# Patient Record
Sex: FEMALE | Race: OTHER | URBAN - METROPOLITAN AREA
[De-identification: names, ages, dates, MRNs, and addresses within clinical notes are randomized per-mention and may not be internally consistent; named-entity substitution may affect disease eponyms.]

---

## 2021-08-12 ENCOUNTER — TELEPHONE (OUTPATIENT)
Dept: PRIMARY CARE CLINIC | Age: 26
End: 2021-08-12

## 2021-08-12 NOTE — TELEPHONE ENCOUNTER
----- Message from Wilson Flores sent at 8/12/2021 12:45 PM EDT -----  Subject: Message to Provider    QUESTIONS  Information for Provider? PT has 2 small children that needs shots (12   months) and the other needs a physical (3 yo). Please call to schedule   appointment.   ---------------------------------------------------------------------------  --------------  [place] [facility] INFO  What is the best way for the office to contact you? OK to leave message on   voicemail  Preferred Call Back Phone Number? [phone]  ---------------------------------------------------------------------------  --------------  SCRIPT ANSWERS  Relationship to Patient?  Self

## 2021-08-12 NOTE — TELEPHONE ENCOUNTER
Called and left message for mother to call office back with the kind of insurance they have. We cannot give vaccination to [facility] or medicare patient in office.

## 2021-09-17 ENCOUNTER — OFFICE VISIT (OUTPATIENT)
Dept: FAMILY MEDICINE CLINIC | Age: 26
End: 2021-09-17
Payer: MEDICARE

## 2021-09-17 VITALS
WEIGHT: 124 LBS | DIASTOLIC BLOOD PRESSURE: 64 MMHG | OXYGEN SATURATION: 98 % | HEIGHT: 62 IN | SYSTOLIC BLOOD PRESSURE: 106 MMHG | BODY MASS INDEX: 22.82 KG/M2 | HEART RATE: 76 BPM

## 2021-09-17 DIAGNOSIS — Z00.01 ENCOUNTER FOR WELL ADULT EXAM WITH ABNORMAL FINDINGS: Primary | ICD-10-CM

## 2021-09-17 DIAGNOSIS — K21.9 GASTROESOPHAGEAL REFLUX DISEASE WITHOUT ESOPHAGITIS: ICD-10-CM

## 2021-09-17 DIAGNOSIS — E03.9 ACQUIRED HYPOTHYROIDISM: ICD-10-CM

## 2021-09-17 PROBLEM — N39.3 STRESS BLADDER INCONTINENCE, FEMALE: Status: ACTIVE | Noted: 2020-10-13

## 2021-09-17 PROBLEM — Z98.890 S/P LUMPECTOMY, LEFT BREAST: Status: ACTIVE | Noted: 2017-10-30

## 2021-09-17 PROBLEM — R12 HEARTBURN DURING PREGNANCY IN FIRST TRIMESTER: Status: RESOLVED | Noted: 2020-02-27 | Resolved: 2021-09-17

## 2021-09-17 PROBLEM — O26.891 HEARTBURN DURING PREGNANCY IN FIRST TRIMESTER: Status: RESOLVED | Noted: 2020-02-27 | Resolved: 2021-09-17

## 2021-09-17 PROBLEM — N64.89 OCCLUSION OF BREAST DUCT: Status: ACTIVE | Noted: 2020-09-01

## 2021-09-17 PROBLEM — O26.891 HEARTBURN DURING PREGNANCY IN FIRST TRIMESTER: Status: ACTIVE | Noted: 2020-02-27

## 2021-09-17 PROBLEM — R12 HEARTBURN DURING PREGNANCY IN FIRST TRIMESTER: Status: ACTIVE | Noted: 2020-02-27

## 2021-09-17 PROBLEM — N64.89 OCCLUSION OF BREAST DUCT: Status: RESOLVED | Noted: 2020-09-01 | Resolved: 2021-09-17

## 2021-09-17 PROCEDURE — 99213 OFFICE O/P EST LOW 20 MIN: CPT | Performed by: FAMILY MEDICINE

## 2021-09-17 PROCEDURE — 99385 PREV VISIT NEW AGE 18-39: CPT | Performed by: FAMILY MEDICINE

## 2021-09-17 RX ORDER — LEVOTHYROXINE SODIUM 0.05 MG/1
50 TABLET ORAL
COMMUNITY
Start: 2020-10-15 | End: 2021-09-17 | Stop reason: SDUPTHER

## 2021-09-17 RX ORDER — LEVOTHYROXINE SODIUM 0.05 MG/1
50 TABLET ORAL DAILY
Qty: 90 TABLET | Refills: 1 | Status: CANCELLED | OUTPATIENT
Start: 2021-09-17

## 2021-09-17 RX ORDER — LEVOTHYROXINE SODIUM 0.05 MG/1
50 TABLET ORAL DAILY
Qty: 90 TABLET | Refills: 3 | Status: SHIPPED | OUTPATIENT
Start: 2021-09-17 | End: 2022-08-16

## 2021-09-17 RX ORDER — OMEPRAZOLE 20 MG/1
20 CAPSULE, DELAYED RELEASE ORAL
Qty: 90 CAPSULE | Refills: 1 | Status: SHIPPED | OUTPATIENT
Start: 2021-09-17 | End: 2022-08-15

## 2021-09-17 SDOH — ECONOMIC STABILITY: FOOD INSECURITY: WITHIN THE PAST 12 MONTHS, THE FOOD YOU BOUGHT JUST DIDN'T LAST AND YOU DIDN'T HAVE MONEY TO GET MORE.: NEVER TRUE

## 2021-09-17 SDOH — ECONOMIC STABILITY: FOOD INSECURITY: WITHIN THE PAST 12 MONTHS, YOU WORRIED THAT YOUR FOOD WOULD RUN OUT BEFORE YOU GOT MONEY TO BUY MORE.: NEVER TRUE

## 2021-09-17 ASSESSMENT — PATIENT HEALTH QUESTIONNAIRE - PHQ9
1. LITTLE INTEREST OR PLEASURE IN DOING THINGS: 0
2. FEELING DOWN, DEPRESSED OR HOPELESS: 0
SUM OF ALL RESPONSES TO PHQ QUESTIONS 1-9: 0
SUM OF ALL RESPONSES TO PHQ9 QUESTIONS 1 & 2: 0
SUM OF ALL RESPONSES TO PHQ QUESTIONS 1-9: 0
SUM OF ALL RESPONSES TO PHQ QUESTIONS 1-9: 0

## 2021-09-17 ASSESSMENT — ENCOUNTER SYMPTOMS
ABDOMINAL PAIN: 0
VOMITING: 0
DIARRHEA: 0
NAUSEA: 0
SHORTNESS OF BREATH: 0
WHEEZING: 0
COUGH: 0
SINUS PRESSURE: 0

## 2021-09-17 ASSESSMENT — SOCIAL DETERMINANTS OF HEALTH (SDOH): HOW HARD IS IT FOR YOU TO PAY FOR THE VERY BASICS LIKE FOOD, HOUSING, MEDICAL CARE, AND HEATING?: NOT HARD AT ALL

## 2021-09-17 NOTE — ASSESSMENT & PLAN NOTE
Established, uncontrolled. Symptoms improved with PPI every 2 to 3 days but she is unable to wean off completely. She reports regurgitation of food. No red flag findings concerning for malignancy. Will defer testing for H. pylori to GI. Referral placed to GI. Continue omeprazole 20 mg daily.   Patient agrees with this plan

## 2021-09-17 NOTE — PROGRESS NOTES
Single     Spouse name: Not on file    Number of children: Not on file    Years of education: Not on file    Highest education level: Not on file   Occupational History    Not on file   Tobacco Use    Smoking status: Never Smoker    Smokeless tobacco: Never Used   Vaping Use    Vaping Use: Never used   Substance and Sexual Activity    Alcohol use: Not Currently     Comment: socially only     Drug use: Never    Sexual activity: Yes     Partners: Male   Other Topics Concern    Not on file   Social History Narrative    Not on file     Social Determinants of Health     Financial Resource Strain: Low Risk     Difficulty of Paying Living Expenses: Not hard at all   Food Insecurity: No Food Insecurity    Worried About Running Out of Food in the Last Year: Never true    [address] in the Last Year: Never true   Transportation Needs:     Lack of Transportation (Medical):      Lack of Transportation (Non-Medical):    Physical Activity:     Days of Exercise per Week:     Minutes of Exercise per Session:    Stress:     Feeling of Stress :    Social Connections:     Frequency of Communication with Friends and Family:     Frequency of Social Gatherings with Friends and Family:     Attends Oriental orthodox Services:     Active Member of Clubs or Organizations:     Attends Club or Organization Meetings:     Marital Status:    Intimate Partner Violence:     Fear of Current or Ex-Partner:     Emotionally Abused:     Physically Abused:     Sexually Abused:         Family History   Problem Relation Age of Onset    Cancer Neg Hx     Diabetes Neg Hx     Heart Attack Neg Hx     Heart Disease Neg Hx     High Blood Pressure Neg Hx     High Cholesterol Neg Hx     Stroke Neg Hx        ADVANCE DIRECTIVE: N, <no information>    Vitals:    09/17/21 1040   BP: 106/64   Site: Right Upper Arm   Position: Sitting   Cuff Size: Medium Adult   Pulse: 76   SpO2: 98%   Weight: 124 lb (56.2 kg)   Height: 5' 2\" (1.575 m) Estimated body mass index is 22.68 kg/m² as calculated from the following:    Height as of this encounter: 5' 2\" (1.575 m). Weight as of this encounter: 124 lb (56.2 kg). Physical Exam  Constitutional:       Appearance: Normal appearance. HENT:      Head: Normocephalic. Nose: Nose normal.   Eyes:      Conjunctiva/sclera: Conjunctivae normal.   Cardiovascular:      Rate and Rhythm: Normal rate and regular rhythm. Pulses: Normal pulses. Heart sounds: Normal heart sounds. No murmur heard. No gallop. Pulmonary:      Effort: Pulmonary effort is normal.      Breath sounds: Normal breath sounds. No wheezing or rhonchi. Abdominal:      General: Abdomen is flat. There is no distension. Neurological:      Mental Status: She is alert and oriented to person, place, and time. Psychiatric:         Behavior: Behavior normal.       Immunization History   Administered Date(s) Administered    Influenza Virus Vaccine 10/28/2016    Tdap (Boostrix, Adacel) 10/28/2016, 07/01/2020       Health Maintenance   Topic Date Due    TSH testing  Never done    Hepatitis C screen  Never done    Varicella vaccine (1 of 2 - 2-dose childhood series) Never done    HPV vaccine (1 - 2-dose series) Never done    COVID-19 Vaccine (1) Never done    HIV screen  Never done    Flu vaccine (1) 09/01/2021    Pap smear  02/27/2023    DTaP/Tdap/Td vaccine (3 - Td or Tdap) 07/01/2030    Hepatitis A vaccine  Aged Out    Hepatitis B vaccine  Aged Out    Hib vaccine  Aged Out    Meningococcal (ACWY) vaccine  Aged Out    Pneumococcal 0-64 years Vaccine  Aged Out          ASSESSMENT/PLAN:  1. Encounter for well adult exam with abnormal findings  Assessment & Plan:  Well female. Up-to-date on Pap. Due in February 2023. Has 2 children ages 3 and 3. Uses condoms for contraception. Previously on birth control pills; declines them at this time  2. Acquired hypothyroidism  Assessment & Plan:  Established, stable.   Ran out of Synthroid yesterday. due for TSH check. refill placed for Synthroid 50 mcg daily. Will adjust dose as needed based on labs. Recommend she take the thyroid and omeprazole at different times during the day as the omeprazole decreases absorption of Synthroid. Previously took them at the same time and will change when she takes them  Orders:  -     TSH with Reflex; Future  -     Lipid Panel; Future  -     Comprehensive Metabolic Panel; Future  -     levothyroxine (SYNTHROID) 50 MCG tablet; Take 1 tablet by mouth Daily, Disp-90 tablet, R-3Normal  3. Gastroesophageal reflux disease without esophagitis  Assessment & Plan:  Established, uncontrolled. Symptoms improved with PPI every 2 to 3 days but she is unable to wean off completely. She reports regurgitation of food. No red flag findings concerning for malignancy. Will defer testing for H. pylori to GI. Referral placed to GI. Continue omeprazole 20 mg daily. Patient agrees with this plan  Orders:  -     omeprazole (PRILOSEC) 20 MG delayed release capsule; Take 1 capsule by mouth every morning (before breakfast), Disp-90 capsule, R-1Normal  -     Amb External Referral To Gastroenterology      Return in about 1 year (around 9/17/2022), or for lab review, for annual exam.       An electronic signature was used to authenticate this note.     --Danny Smiley MD on 9/17/2021 at 11:40 AM

## 2021-09-17 NOTE — ASSESSMENT & PLAN NOTE
Established, stable. Ran out of Synthroid yesterday. due for TSH check. refill placed for Synthroid 50 mcg daily. Will adjust dose as needed based on labs. Recommend she take the thyroid and omeprazole at different times during the day as the omeprazole decreases absorption of Synthroid.   Previously took them at the same time and will change when she takes them

## 2021-10-05 ENCOUNTER — OFFICE VISIT (OUTPATIENT)
Dept: FAMILY MEDICINE CLINIC | Age: 26
End: 2021-10-05
Payer: MEDICARE

## 2021-10-05 VITALS
HEART RATE: 69 BPM | TEMPERATURE: 97.9 F | BODY MASS INDEX: 21.79 KG/M2 | HEIGHT: 63 IN | OXYGEN SATURATION: 99 % | DIASTOLIC BLOOD PRESSURE: 64 MMHG | SYSTOLIC BLOOD PRESSURE: 102 MMHG | WEIGHT: 123 LBS

## 2021-10-05 DIAGNOSIS — G43.009 MIGRAINE WITHOUT AURA AND WITHOUT STATUS MIGRAINOSUS, NOT INTRACTABLE: ICD-10-CM

## 2021-10-05 DIAGNOSIS — N30.01 ACUTE CYSTITIS WITH HEMATURIA: Primary | ICD-10-CM

## 2021-10-05 LAB
BILIRUBIN URINE: NEGATIVE
BLOOD, URINE: ABNORMAL
CLARITY: CLEAR
COLOR: YELLOW
EPITHELIAL CELLS, UA: 4 /HPF (ref 0–5)
GLUCOSE URINE: NEGATIVE MG/DL
HYALINE CASTS: 1 /LPF (ref 0–8)
KETONES, URINE: NEGATIVE MG/DL
LEUKOCYTE ESTERASE, URINE: ABNORMAL
MICROSCOPIC EXAMINATION: YES
NITRITE, URINE: NEGATIVE
PH UA: 7.5 (ref 5–8)
PROTEIN UA: NEGATIVE MG/DL
RBC UA: 4 /HPF (ref 0–4)
SPECIFIC GRAVITY UA: 1.01 (ref 1–1.03)
URINE TYPE: ABNORMAL
UROBILINOGEN, URINE: 0.2 E.U./DL
WBC UA: 10 /HPF (ref 0–5)

## 2021-10-05 PROCEDURE — 99213 OFFICE O/P EST LOW 20 MIN: CPT | Performed by: FAMILY MEDICINE

## 2021-10-05 RX ORDER — NITROFURANTOIN 25; 75 MG/1; MG/1
100 CAPSULE ORAL 2 TIMES DAILY
Qty: 10 CAPSULE | Refills: 0 | Status: SHIPPED | OUTPATIENT
Start: 2021-10-05 | End: 2021-10-10

## 2021-10-05 RX ORDER — SUMATRIPTAN 25 MG/1
TABLET, FILM COATED ORAL
Qty: 9 TABLET | Refills: 1 | Status: SHIPPED
Start: 2021-10-05 | End: 2022-08-15

## 2021-10-05 NOTE — PROGRESS NOTES
Chief complaint: Urinary Tract Infection (burning sensation while urinaitng pain in lower abdomen and lower back  pt had uti last month seen at urgent care) and Migraine      SUBJECTIVE:  CLOVIS Call (:  1995) is a 31 y.o. female with a past medical history of GERD and hypothyroidism who presents with a chief complaint of: migraine x10 hours and urinary burning, frequency and urgency x1 day. She woke up this morning with a migraine which she has had before and threw up. She feels crummy from the migraine and has UTI sx as well. Denies fevers at home. She has pain in the front of the belly and low back pain. No flank pain. No upper back pain. Migraines: sometimes left sided, sometimes both sides; associated with light sensitivity and nausea/vomiting. Previously saw neurology when she lived in [place] but she was trying to get pregnant so she couldn't be on much. Tylenol doesn't touch the headache. She slept today and feels slightly better. No vision changes, weakness/numbness anywhere prior to headache. Review of Systems:  General: No F/C/NS/fatigue/wt loss   Cardiovascular: No CP  Respiratory: No SOB  GI: No N/V/D/C/abd pain/blood in stool  Neuro:+ headache; no weakness  Psych: No depressed mood/anxiety  Musculoskeletal: No myalgias    Current Outpatient Medications on File Prior to Visit   Medication Sig Dispense Refill    omeprazole (PRILOSEC) 20 MG delayed release capsule Take 1 capsule by mouth every morning (before breakfast) 90 capsule 1    levothyroxine (SYNTHROID) 50 MCG tablet Take 1 tablet by mouth Daily 90 tablet 3     No current facility-administered medications on file prior to visit.        OBJECTIVE:  /64   Pulse 69   Temp 97.9 °F (36.6 °C) (Oral)   Ht 5' 3\" (1.6 m)   Wt 123 lb (55.8 kg)   LMP 10/02/2021   SpO2 99%   BMI 21.79 kg/m²    Physical exam:  afebrile, vitals reviewed  Gen:  WD, WN, NAD, A&Ox3, pleasant  Eyes:  Sclerae clear  Neck:  Supple, No cervical

## 2021-10-05 NOTE — ASSESSMENT & PLAN NOTE
New, uncontrolled. Hx and exam consistent with UTI. Macrobid 100mg bid x5 days. Call clinic if sx don't improve. Pt agrees.

## 2021-10-05 NOTE — ASSESSMENT & PLAN NOTE
Chronic, uncontrolled. Improved with pregnancy but now occurring again as her kiddo is 1yr old. Trial imitrex 25mg po x1, repeat in 2hrs. Can increase the dose if ineffective. Counseled on s/e including drowsiness,  Jitteriness. If needing it more than 3x/week, call clinic to consider ppx therapy. Pt agrees with plan.

## 2021-10-06 LAB
ORGANISM: ABNORMAL
URINE CULTURE, ROUTINE: ABNORMAL

## 2021-10-17 PROBLEM — Z00.01 ENCOUNTER FOR WELL ADULT EXAM WITH ABNORMAL FINDINGS: Status: RESOLVED | Noted: 2021-09-17 | Resolved: 2021-10-17

## 2022-01-20 ENCOUNTER — IMMUNIZATION (OUTPATIENT)
Dept: PRIMARY CARE CLINIC | Age: 27
End: 2022-01-20
Payer: MEDICARE

## 2022-01-20 DIAGNOSIS — Z23 NEED FOR COVID-19 VACCINE: Primary | ICD-10-CM

## 2022-01-20 PROCEDURE — 0064A COVID-19, MODERNA BOOSTER VACCINE 0.25ML DOSE: CPT | Performed by: NURSE PRACTITIONER

## 2022-01-20 PROCEDURE — 91306 COVID-19, MODERNA BOOSTER VACCINE 0.25ML DOSE: CPT | Performed by: NURSE PRACTITIONER

## 2022-03-09 ENCOUNTER — OFFICE VISIT (OUTPATIENT)
Dept: FAMILY MEDICINE CLINIC | Age: 27
End: 2022-03-09
Payer: MEDICARE

## 2022-03-09 VITALS
WEIGHT: 125.8 LBS | HEIGHT: 61 IN | SYSTOLIC BLOOD PRESSURE: 111 MMHG | DIASTOLIC BLOOD PRESSURE: 75 MMHG | RESPIRATION RATE: 16 BRPM | TEMPERATURE: 97.3 F | HEART RATE: 84 BPM | BODY MASS INDEX: 23.75 KG/M2 | OXYGEN SATURATION: 100 %

## 2022-03-09 DIAGNOSIS — Z11.1 TUBERCULOSIS SCREENING: Primary | ICD-10-CM

## 2022-03-09 PROCEDURE — 86580 TB INTRADERMAL TEST: CPT | Performed by: FAMILY MEDICINE

## 2022-03-09 PROCEDURE — 99211 OFF/OP EST MAY X REQ PHY/QHP: CPT | Performed by: FAMILY MEDICINE

## 2022-03-09 RX ORDER — KETOTIFEN FUMARATE 0.35 MG/ML
SOLUTION/ DROPS OPHTHALMIC
COMMUNITY
Start: 2021-12-22 | End: 2022-08-15 | Stop reason: ALTCHOICE

## 2022-03-09 NOTE — PROGRESS NOTES
[facility] [address]  [address] [facility]  Phone: [phone]  Fax: [phone]    Sally Merino MD        March 9, 2022    Patient: Angela Dunn   YOB: 1995   Date of Visit: 3/9/2022       To Whom It May Concern:    Our clinic recently performed a tuberculosis skin test on one of your employees, Chrissy Lopez on 03/09/2022. Patient will return in 48 hours for our staff to read the results: 03/11/2022    LOT: Betzy Krishnamurthy  NDC: 57134-227-40  EXP: 06/02/2023  LOCATION: Left Forearm         If you have any questions or concerns, please don't hesitate to call.     Sincerely,        Sally Merino MD

## 2022-03-09 NOTE — LETTER
[facility] [address]  [address] [facility]  Phone: [phone]  Fax: [phone]    Argenis Sagastume MD        March 9, 2022    Patient: Severo Blend   YOB: 1995   Date of Visit: 3/9/2022       To Whom It May Concern:    Our clinic recently performed a tuberculosis skin test on one of your employees, Diane Mccartney on 03/09/2022. Patient will return in 48 hours for our staff to read the results: 03/11/2022    LOT: Ankush Cunningham  NDC: 94849-607-41  EXP: 06/02/2023  LOCATION: Left Forearm         If you have any questions or concerns, please don't hesitate to call.     Sincerely,        Argenis Sagastume MD

## 2022-03-09 NOTE — LETTER
[facility] [address]  [address] [facility]  Phone: [phone]  Fax: [phone]    Erik Clark MD        March 11, 2022    Patient: Milo Browning   YOB: 1995   Date of Visit: 3/9/2022       To Whom It May Concern:    Our clinic recently performed a tuberculosis skin test on one of your employees, [facility]. The results of this test are as follows:    Lab Results   Component Value Date    PPD negative 03/11/2022    INDURATION 0mm 03/11/2022     This test was negative for tuberculosis exposure per current Centers for Disease Control guidelines. A chest x-ray was not required. If you have any questions or concerns, please don't hesitate to call.     Sincerely,        Erik Clark MD

## 2022-03-11 LAB
INDURATION: NORMAL
TB SKIN TEST: NEGATIVE

## 2022-08-15 ENCOUNTER — OFFICE VISIT (OUTPATIENT)
Dept: FAMILY MEDICINE CLINIC | Age: 27
End: 2022-08-15

## 2022-08-15 VITALS
WEIGHT: 126.8 LBS | SYSTOLIC BLOOD PRESSURE: 111 MMHG | HEIGHT: 63 IN | DIASTOLIC BLOOD PRESSURE: 77 MMHG | RESPIRATION RATE: 16 BRPM | HEART RATE: 81 BPM | TEMPERATURE: 98.3 F | OXYGEN SATURATION: 100 % | BODY MASS INDEX: 22.47 KG/M2

## 2022-08-15 DIAGNOSIS — E03.9 ACQUIRED HYPOTHYROIDISM: ICD-10-CM

## 2022-08-15 DIAGNOSIS — K21.9 GASTROESOPHAGEAL REFLUX DISEASE WITHOUT ESOPHAGITIS: ICD-10-CM

## 2022-08-15 DIAGNOSIS — Z3A.01 LESS THAN 8 WEEKS GESTATION OF PREGNANCY: Primary | ICD-10-CM

## 2022-08-15 DIAGNOSIS — Z3A.01 LESS THAN 8 WEEKS GESTATION OF PREGNANCY: ICD-10-CM

## 2022-08-15 DIAGNOSIS — Z32.01 PREGNANCY CONFIRMED BY POSITIVE URINE TEST: ICD-10-CM

## 2022-08-15 LAB
CONTROL: PRESENT
PREGNANCY TEST URINE, POC: POSITIVE

## 2022-08-15 RX ORDER — OMEPRAZOLE 20 MG/1
20 TABLET, DELAYED RELEASE ORAL DAILY
Qty: 30 TABLET | Refills: 3 | Status: SHIPPED | OUTPATIENT
Start: 2022-08-15

## 2022-08-15 ASSESSMENT — PATIENT HEALTH QUESTIONNAIRE - PHQ9
SUM OF ALL RESPONSES TO PHQ QUESTIONS 1-9: 0
2. FEELING DOWN, DEPRESSED OR HOPELESS: 0
1. LITTLE INTEREST OR PLEASURE IN DOING THINGS: 0
SUM OF ALL RESPONSES TO PHQ QUESTIONS 1-9: 0
SUM OF ALL RESPONSES TO PHQ QUESTIONS 1-9: 0
SUM OF ALL RESPONSES TO PHQ9 QUESTIONS 1 & 2: 0
SUM OF ALL RESPONSES TO PHQ QUESTIONS 1-9: 0

## 2022-08-15 NOTE — PROGRESS NOTES
Chief Complaint: Pregnancy Test (reports she took a urine pregnancy test 10 days ago and it was positive; she has 3and 11year old girls; denies any nausea or vomiting but reports being tired a little more than usual )       HPI:  Rosa Mccall is a 31 y.o. female here to confirm pregnancy. She had a positive home test.  She denies any concerns today but has been having symptoms of acid reflux. Denies any nausea or vomiting. She has history of hypothyroidism and taking Synthroid 50 mcg daily    ROS:  Constitutional: Negative   HENT: Negative  Respiratory: Negative   Cardiovascular: Negative for chest pain, palpitations and leg swelling. Gastrointestinal: Symptoms of gerd   Genitourinary: Negative   Musculoskeletal: Negative for gait problem, joint swelling and myalgias. Skin: Negative for color change, pallor, rash and wound. Neurological: Negative     Patient's problem list, medications, allergies, past medical, surgical, social and family histories were reviewed and updated as appropriate. Current Outpatient Medications   Medication Sig Dispense Refill    omeprazole (PRILOSEC OTC) 20 MG tablet Take 1 tablet by mouth in the morning. 30 tablet 3    levothyroxine (SYNTHROID) 50 MCG tablet Take 1 tablet by mouth Daily 90 tablet 3     No current facility-administered medications for this visit. Social History     Tobacco Use    Smoking status: Never    Smokeless tobacco: Never   Substance Use Topics    Alcohol use: Not Currently     Comment: socially only         Objective:     Vitals:    08/15/22 1325   BP: 111/77   Site: Left Upper Arm   Position: Sitting   Cuff Size: Small Adult   Pulse: 81   Resp: 16   Temp: 98.3 °F (36.8 °C)   TempSrc: Temporal   SpO2: 100%   Weight: 126 lb 12.8 oz (57.5 kg)   Height: 5' 3\" (1.6 m)     Body mass index is 22.46 kg/m².      Wt Readings from Last 3 Encounters:   08/15/22 126 lb 12.8 oz (57.5 kg)   03/09/22 125 lb 12.8 oz (57.1 kg)   10/05/21 123 lb (55.8 kg) BP Readings from Last 3 Encounters:   08/15/22 111/77   03/09/22 111/75   10/05/21 102/64       Physical exam:  Constitutional: she is oriented to person, place, and time. she appears well-developed and well-nourished. No distress. Cardiovascular: Normal rate, regular rhythm, normal heart sounds and intact distal pulses. No murmur heard. Pulmonary/Chest: Effort normal and breath sounds normal. No stridor. No respiratory distress. she has no wheezes. she has no rales. sheexhibits no tenderness. Abdominal: Soft. Bowel sounds are normal. she exhibits no distension and no mass. There is no tenderness. There is no rebound and no guarding. Neurological:she is alert and oriented to person, place, and time. she has gross neurological exam normal with normal strength and normal gait  Skin: Skin is warm and dry. No rash noted. she is not diaphoretic. No erythema. No pallor. Psychiatric: she has a normal mood and affect. her   behavior is normal.      Assessment/Plan:   1. Less than 8 weeks gestation of pregnancy  - External Referral To Obstetrics & Gynecology    2. Pregnancy confirmed by positive urine test  Positive pregnancy test   - POCT urine pregnancy    3. Acquired hypothyroidism  - TSH with Reflex; Future  Currently taking synthroid 50 mcg daily    4.  Gastroesophageal reflux disease without esophagitis  Prilosec prjeancarlos Pascual MD  8/15/2022 2:07 PM

## 2022-08-16 LAB
T4 FREE: 1.2 NG/DL (ref 0.9–1.8)
TSH REFLEX: 8.44 UIU/ML (ref 0.27–4.2)

## 2022-08-16 RX ORDER — LEVOTHYROXINE SODIUM 88 UG/1
88 TABLET ORAL DAILY
Qty: 90 TABLET | Refills: 1 | Status: SHIPPED | OUTPATIENT
Start: 2022-08-16

## 2022-08-24 ENCOUNTER — TELEPHONE (OUTPATIENT)
Dept: FAMILY MEDICINE CLINIC | Age: 27
End: 2022-08-24

## 2022-08-24 NOTE — TELEPHONE ENCOUNTER
Patient is calling to stated that she seen Dr. Robinson Kelly last week she is 8 weeks pregnant.    Today she is nauseous and is wanting to know if she can have medication called into the pharmacy        Patient would like a phone call in return      Please advise

## 2022-08-29 RX ORDER — ONDANSETRON 4 MG/1
4 TABLET, FILM COATED ORAL DAILY PRN
Qty: 30 TABLET | Refills: 0 | Status: SHIPPED | OUTPATIENT
Start: 2022-08-29

## 2022-08-31 NOTE — TELEPHONE ENCOUNTER
Patient states that she is feeling better since she started taking the Zofran. She states that she has had this with previous pregnancies      Her first appointment with OB is 9/13.

## 2022-09-15 ENCOUNTER — TELEPHONE (OUTPATIENT)
Dept: FAMILY MEDICINE CLINIC | Age: 27
End: 2022-09-15

## 2022-09-15 NOTE — TELEPHONE ENCOUNTER
Pt had a positive pregnancy test done OV on 8/15   We need date of last menstral cycle before we  can submit to insurance put in the encounter  Can you please call the pt and put in addendment

## 2022-09-21 NOTE — TELEPHONE ENCOUNTER
The last menstrual date was already documented one month ago on 8/15/2022. I spoke with patient to confirm last menstrual period was 7/06/2022 and she confirmed that was correct. No further action is needed at this time. Thank you!

## 2022-12-29 ENCOUNTER — TELEPHONE (OUTPATIENT)
Dept: FAMILY MEDICINE CLINIC | Age: 27
End: 2022-12-29

## 2022-12-29 NOTE — TELEPHONE ENCOUNTER
Patient went to urgent care recently and was prescribed antibiotics. She had a reaction to the antibiotics that caused a rash on her bottom and throat pain. She's requesting an in-person appointment, however there is no availability today or tomorrow. Please advise.

## 2022-12-29 NOTE — TELEPHONE ENCOUNTER
Faustino Ledesma MD  You Just now (8:56 AM)     [facility]  Which antibiotic was it? Sometimes viruses can cause that to happen (rash and throat pain) and isn't necessarily an allergy to the antibiotic. Regardless, She should stop the antibiotic. She can go back to the same urgent care she went to and discuss it with a clinician there since we don't have availability this week. Called and spoke with patient to see what medication she was given and to let her know to go back to the Urgent care where she was seen. Patient states that she was given Amoxicillin and she has taking this medication before. Patient informed to go to Urgent care and be seen by them again.  Patient will follow up as need to

## 2023-02-16 RX ORDER — OMEPRAZOLE 20 MG/1
CAPSULE, DELAYED RELEASE ORAL
Qty: 90 CAPSULE | Refills: 2 | OUTPATIENT
Start: 2023-02-16

## 2023-02-16 RX ORDER — LEVOTHYROXINE SODIUM 88 UG/1
TABLET ORAL
Qty: 90 TABLET | Refills: 2 | OUTPATIENT
Start: 2023-02-16

## 2023-02-16 NOTE — TELEPHONE ENCOUNTER
Medication:   Requested Prescriptions     Pending Prescriptions Disp Refills    omeprazole (PRILOSEC) 20 MG delayed release capsule [Pharmacy Med Name: OMEPRAZOLE DR 20 MG CAPSULE] 30 capsule      Sig: TAKE ONE CAPSULE BY MOUTH EVERY MORNING    levothyroxine (SYNTHROID) 88 MCG tablet [Pharmacy Med Name: LEVOTHYROXINE 88 MCG TABLET] 30 tablet      Sig: TAKE ONE TABLET BY MOUTH EVERY MORNING       Last Filled:  Prilosec 08/15/2022 #30 #3rf  Synthroid 08/15/2022 #90 1rf     Patient Phone Number: [phone] (home)     Last appt: 8/15/2022   Next appt: Visit date not found    Last Thyroid:   Lab Results   Component Value Date/Time    T4FREE 1.2 08/15/2022 02:35 PM

## 2023-02-20 RX ORDER — OMEPRAZOLE 20 MG/1
CAPSULE, DELAYED RELEASE ORAL
Qty: 30 CAPSULE | OUTPATIENT
Start: 2023-02-20

## 2023-02-20 RX ORDER — LEVOTHYROXINE SODIUM 88 UG/1
TABLET ORAL
Qty: 30 TABLET | OUTPATIENT
Start: 2023-02-20

## 2023-02-20 NOTE — TELEPHONE ENCOUNTER
Anesthesia Post Evaluation    Patient: Magalys Pina    Procedure(s) Performed: Procedure(s) (LRB):  COLONOSCOPY (N/A)    Final Anesthesia Type: MAC      Patient location during evaluation: PACU  Patient participation: Yes- Able to Participate  Level of consciousness: awake and alert  Post-procedure vital signs: reviewed and stable  Pain management: adequate  Airway patency: patent    PONV status at discharge: No PONV  Anesthetic complications: no      Cardiovascular status: blood pressure returned to baseline  Respiratory status: unassisted  Hydration status: euvolemic  Follow-up not needed.          Vitals Value Taken Time   /66 08/03/22 0835   Temp 98 08/03/22 0835   Pulse 66 08/03/22 0835   Resp 12 08/03/22 0835   SpO2 98 08/03/22 0835         No case tracking events are documented in the log.      Pain/Madonna Score: No data recorded       Coreen Cavazos MD  [facility] Clinical Staff 4 days ago     [facility]  Pt was pregnant as of 8/15/22. If patient is still pregnant, OB/gyn should be prescribing these. Please call pt and clarify if she's still pregnant. thanks     Called and spoke with patient she informed me that she is still pregnant and she is informed to have her OB/GYN prescribe those medications for her.

## 2023-07-11 RX ORDER — OMEPRAZOLE 20 MG/1
20 TABLET, DELAYED RELEASE ORAL DAILY
Qty: 30 TABLET | Refills: 3 | Status: SHIPPED | OUTPATIENT
Start: 2023-07-11 | End: 2023-07-20 | Stop reason: SDUPTHER

## 2023-07-11 RX ORDER — LEVOTHYROXINE SODIUM 88 UG/1
88 TABLET ORAL DAILY
Qty: 90 TABLET | Refills: 1 | Status: SHIPPED | OUTPATIENT
Start: 2023-07-11

## 2023-07-11 NOTE — TELEPHONE ENCOUNTER
----- Message from Willis Darryl sent at 7/11/2023 11:37 AM EDT -----  Subject: Referral Request    Reason for referral request? Pt is requesting new blood work to be done . Pt needs re elham on levothyroxine (SYNTHROID) 88 MCG tablet 1 tablet daily   and omeprazole (PRILOSEC OTC) 20 MG tablet 1 tablet daily. Pt Pharmacy is   [facility] [id number] - [address] [facility] [phone]  Provider patient wants to be referred to(if known):     Provider Phone Number(if known):     Additional Information for Provider?   ---------------------------------------------------------------------------  --------------  [address]    [phone]; OK to leave message on voicemail  ---------------------------------------------------------------------------  --------------

## 2023-07-11 NOTE — TELEPHONE ENCOUNTER
Medication:   Requested Prescriptions     Pending Prescriptions Disp Refills    levothyroxine (SYNTHROID) 88 MCG tablet 90 tablet 1     Sig: Take 1 tablet by mouth daily    omeprazole (PRILOSEC OTC) 20 MG tablet 30 tablet 3     Sig: Take 1 tablet by mouth daily     Last Filled:  8/16/22#90 refills 1                        8/15/22 #30 refills 3    Last appt: 8/15/2022   Next appt: Visit date not found    Last OARRS: No flowsheet data found.

## 2023-07-13 ENCOUNTER — TELEPHONE (OUTPATIENT)
Dept: FAMILY MEDICINE CLINIC | Age: 28
End: 2023-07-13

## 2023-07-13 NOTE — TELEPHONE ENCOUNTER
Office has been notified that pt is requiring Prior Authorization for the following medication:  Omeprazole    Please initiate this request through CoverMyMeds, contacting the following Payor/Insurance:  [employer]     Please see below, or the documentation attached to this encounter for any additional information that may assist in processing PA:  --     Thank you!

## 2023-07-14 NOTE — TELEPHONE ENCOUNTER
Submitted PA for OMEPRAZOLE  [facility]: [id number] STATUS: PENDING. Follow up done daily; if no response in three days we will refax for status check. If another three days goes by with no response we will call the insurance for status.

## 2023-07-17 NOTE — TELEPHONE ENCOUNTER
\"NO PRIOR AUTHORIZATION NEEDED. \" LETTER ATTACHED. If this requires a response please respond to the pool. [facility]). Please advise patient thank you.

## 2023-07-20 ENCOUNTER — OFFICE VISIT (OUTPATIENT)
Dept: FAMILY MEDICINE CLINIC | Age: 28
End: 2023-07-20
Payer: MEDICAID

## 2023-07-20 VITALS
SYSTOLIC BLOOD PRESSURE: 94 MMHG | BODY MASS INDEX: 22.28 KG/M2 | OXYGEN SATURATION: 99 % | RESPIRATION RATE: 16 BRPM | HEART RATE: 62 BPM | DIASTOLIC BLOOD PRESSURE: 72 MMHG | TEMPERATURE: 97.1 F | WEIGHT: 125.8 LBS

## 2023-07-20 DIAGNOSIS — Z13.1 SCREENING FOR DIABETES MELLITUS: ICD-10-CM

## 2023-07-20 DIAGNOSIS — K21.9 GASTROESOPHAGEAL REFLUX DISEASE WITHOUT ESOPHAGITIS: ICD-10-CM

## 2023-07-20 DIAGNOSIS — Z13.220 ENCOUNTER FOR LIPID SCREENING FOR CARDIOVASCULAR DISEASE: ICD-10-CM

## 2023-07-20 DIAGNOSIS — Z00.01 ENCOUNTER FOR ROUTINE ADULT HEALTH EXAMINATION WITH ABNORMAL FINDINGS: Primary | ICD-10-CM

## 2023-07-20 DIAGNOSIS — Z78.9 UNKNOWN VARICELLA VACCINATION STATUS: ICD-10-CM

## 2023-07-20 DIAGNOSIS — E03.9 ACQUIRED HYPOTHYROIDISM: ICD-10-CM

## 2023-07-20 DIAGNOSIS — Z13.6 ENCOUNTER FOR LIPID SCREENING FOR CARDIOVASCULAR DISEASE: ICD-10-CM

## 2023-07-20 PROBLEM — N36.42 INTRINSIC SPHINCTER DEFICIENCY (ISD): Status: ACTIVE | Noted: 2023-06-13

## 2023-07-20 PROCEDURE — 99395 PREV VISIT EST AGE 18-39: CPT | Performed by: FAMILY MEDICINE

## 2023-07-20 RX ORDER — OMEPRAZOLE 20 MG/1
20 TABLET, DELAYED RELEASE ORAL DAILY
Qty: 90 TABLET | Refills: 3 | Status: SHIPPED | OUTPATIENT
Start: 2023-07-20

## 2023-07-20 ASSESSMENT — PATIENT HEALTH QUESTIONNAIRE - PHQ9
2. FEELING DOWN, DEPRESSED OR HOPELESS: 0
1. LITTLE INTEREST OR PLEASURE IN DOING THINGS: 0
SUM OF ALL RESPONSES TO PHQ9 QUESTIONS 1 & 2: 0
SUM OF ALL RESPONSES TO PHQ QUESTIONS 1-9: 0

## 2023-07-21 DIAGNOSIS — E03.9 ACQUIRED HYPOTHYROIDISM: ICD-10-CM

## 2023-07-21 DIAGNOSIS — K21.9 GASTROESOPHAGEAL REFLUX DISEASE WITHOUT ESOPHAGITIS: ICD-10-CM

## 2023-07-21 DIAGNOSIS — Z13.220 ENCOUNTER FOR LIPID SCREENING FOR CARDIOVASCULAR DISEASE: ICD-10-CM

## 2023-07-21 DIAGNOSIS — Z13.1 SCREENING FOR DIABETES MELLITUS: ICD-10-CM

## 2023-07-21 DIAGNOSIS — Z78.9 UNKNOWN VARICELLA VACCINATION STATUS: ICD-10-CM

## 2023-07-21 DIAGNOSIS — Z00.01 ENCOUNTER FOR ROUTINE ADULT HEALTH EXAMINATION WITH ABNORMAL FINDINGS: ICD-10-CM

## 2023-07-21 DIAGNOSIS — Z13.6 ENCOUNTER FOR LIPID SCREENING FOR CARDIOVASCULAR DISEASE: ICD-10-CM

## 2023-12-06 LAB
ANION GAP SERPL CALCULATED.3IONS-SCNC: 8 MMOL/L (ref 3–16)
BASOPHILS # BLD: 0 K/UL (ref 0–0.2)
BASOPHILS NFR BLD: 0.5 %
BUN SERPL-MCNC: 9 MG/DL (ref 7–20)
CALCIUM SERPL-MCNC: 9.1 MG/DL (ref 8.3–10.6)
CHLORIDE SERPL-SCNC: 106 MMOL/L (ref 99–110)
CHOLEST SERPL-MCNC: 139 MG/DL (ref 0–199)
CO2 SERPL-SCNC: 26 MMOL/L (ref 21–32)
CREAT SERPL-MCNC: 0.6 MG/DL (ref 0.6–1.1)
DEPRECATED RDW RBC AUTO: 14.9 % (ref 12.4–15.4)
EOSINOPHIL # BLD: 0.1 K/UL (ref 0–0.6)
EOSINOPHIL NFR BLD: 2.8 %
GFR SERPLBLD CREATININE-BSD FMLA CKD-EPI: >60 ML/MIN/{1.73_M2}
GLUCOSE SERPL-MCNC: 87 MG/DL (ref 70–99)
HCT VFR BLD AUTO: 35 % (ref 36–48)
HDLC SERPL-MCNC: 36 MG/DL (ref 40–60)
HGB BLD-MCNC: 12 G/DL (ref 12–16)
LDLC SERPL CALC-MCNC: 81 MG/DL
LYMPHOCYTES # BLD: 1.8 K/UL (ref 1–5.1)
LYMPHOCYTES NFR BLD: 37.9 %
MCH RBC QN AUTO: 30.6 PG (ref 26–34)
MCHC RBC AUTO-ENTMCNC: 34.4 G/DL (ref 31–36)
MCV RBC AUTO: 89.1 FL (ref 80–100)
MONOCYTES # BLD: 0.3 K/UL (ref 0–1.3)
MONOCYTES NFR BLD: 5.6 %
NEUTROPHILS # BLD: 2.5 K/UL (ref 1.7–7.7)
NEUTROPHILS NFR BLD: 53.2 %
PLATELET # BLD AUTO: 216 K/UL (ref 135–450)
PMV BLD AUTO: 10.3 FL (ref 5–10.5)
POTASSIUM SERPL-SCNC: 4.7 MMOL/L (ref 3.5–5.1)
RBC # BLD AUTO: 3.93 M/UL (ref 4–5.2)
SODIUM SERPL-SCNC: 140 MMOL/L (ref 136–145)
TRIGL SERPL-MCNC: 111 MG/DL (ref 0–150)
TSH SERPL DL<=0.005 MIU/L-ACNC: 2.8 UIU/ML (ref 0.27–4.2)
VIT B12 SERPL-MCNC: 353 PG/ML (ref 211–911)
VLDLC SERPL CALC-MCNC: 22 MG/DL
WBC # BLD AUTO: 4.7 K/UL (ref 4–11)

## 2023-12-07 LAB — VZV IGG SER QL IA: NORMAL

## 2024-07-30 ENCOUNTER — OFFICE VISIT (OUTPATIENT)
Dept: FAMILY MEDICINE CLINIC | Age: 29
End: 2024-07-30
Payer: MEDICAID

## 2024-07-30 VITALS
SYSTOLIC BLOOD PRESSURE: 106 MMHG | TEMPERATURE: 97.1 F | HEART RATE: 68 BPM | RESPIRATION RATE: 16 BRPM | DIASTOLIC BLOOD PRESSURE: 80 MMHG | BODY MASS INDEX: 23.99 KG/M2 | OXYGEN SATURATION: 98 % | WEIGHT: 135.4 LBS

## 2024-07-30 DIAGNOSIS — K21.9 GASTROESOPHAGEAL REFLUX DISEASE WITHOUT ESOPHAGITIS: ICD-10-CM

## 2024-07-30 DIAGNOSIS — R42 LIGHTHEADEDNESS: ICD-10-CM

## 2024-07-30 DIAGNOSIS — Z00.01 ENCOUNTER FOR ROUTINE ADULT HEALTH EXAMINATION WITH ABNORMAL FINDINGS: Primary | ICD-10-CM

## 2024-07-30 DIAGNOSIS — E03.9 ACQUIRED HYPOTHYROIDISM: ICD-10-CM

## 2024-07-30 DIAGNOSIS — G43.009 MIGRAINE WITHOUT AURA AND WITHOUT STATUS MIGRAINOSUS, NOT INTRACTABLE: ICD-10-CM

## 2024-07-30 DIAGNOSIS — Z78.9 VEGETARIAN DIET: ICD-10-CM

## 2024-07-30 DIAGNOSIS — K64.8 INTERNAL HEMORRHOIDS: ICD-10-CM

## 2024-07-30 PROBLEM — N30.01 ACUTE CYSTITIS WITH HEMATURIA: Status: RESOLVED | Noted: 2021-10-05 | Resolved: 2024-07-30

## 2024-07-30 LAB
BASOPHILS # BLD: 0 K/UL (ref 0–0.2)
BASOPHILS NFR BLD: 0.5 %
DEPRECATED RDW RBC AUTO: 13.9 % (ref 12.4–15.4)
EOSINOPHIL # BLD: 0.1 K/UL (ref 0–0.6)
EOSINOPHIL NFR BLD: 2.1 %
FERRITIN SERPL IA-MCNC: 10.2 NG/ML (ref 15–150)
HCT VFR BLD AUTO: 35.8 % (ref 36–48)
HGB BLD-MCNC: 11.9 G/DL (ref 12–16)
LYMPHOCYTES # BLD: 2.1 K/UL (ref 1–5.1)
LYMPHOCYTES NFR BLD: 35.8 %
MCH RBC QN AUTO: 29.2 PG (ref 26–34)
MCHC RBC AUTO-ENTMCNC: 33.4 G/DL (ref 31–36)
MCV RBC AUTO: 87.5 FL (ref 80–100)
MONOCYTES # BLD: 0.3 K/UL (ref 0–1.3)
MONOCYTES NFR BLD: 5.9 %
NEUTROPHILS # BLD: 3.2 K/UL (ref 1.7–7.7)
NEUTROPHILS NFR BLD: 55.7 %
PLATELET # BLD AUTO: 196 K/UL (ref 135–450)
PMV BLD AUTO: 10.1 FL (ref 5–10.5)
RBC # BLD AUTO: 4.09 M/UL (ref 4–5.2)
T4 FREE SERPL-MCNC: 0.8 NG/DL (ref 0.9–1.8)
TSH SERPL DL<=0.005 MIU/L-ACNC: 20.69 UIU/ML (ref 0.27–4.2)
WBC # BLD AUTO: 5.8 K/UL (ref 4–11)

## 2024-07-30 PROCEDURE — 99214 OFFICE O/P EST MOD 30 MIN: CPT | Performed by: FAMILY MEDICINE

## 2024-07-30 PROCEDURE — 99395 PREV VISIT EST AGE 18-39: CPT | Performed by: FAMILY MEDICINE

## 2024-07-30 PROCEDURE — 1000F TOBACCO USE ASSESSED: CPT | Performed by: FAMILY MEDICINE

## 2024-07-30 PROCEDURE — 3725F SCREEN DEPRESSION PERFORMED: CPT | Performed by: FAMILY MEDICINE

## 2024-07-30 RX ORDER — OMEPRAZOLE 20 MG/1
20 CAPSULE, DELAYED RELEASE ORAL DAILY
Qty: 30 CAPSULE | Refills: 11 | Status: SHIPPED | OUTPATIENT
Start: 2024-07-30

## 2024-07-30 RX ORDER — LEVOTHYROXINE SODIUM 88 UG/1
88 TABLET ORAL DAILY
Qty: 90 TABLET | Refills: 3 | Status: SHIPPED | OUTPATIENT
Start: 2024-07-30

## 2024-07-30 SDOH — ECONOMIC STABILITY: FOOD INSECURITY: WITHIN THE PAST 12 MONTHS, THE FOOD YOU BOUGHT JUST DIDN'T LAST AND YOU DIDN'T HAVE MONEY TO GET MORE.: NEVER TRUE

## 2024-07-30 SDOH — ECONOMIC STABILITY: INCOME INSECURITY: HOW HARD IS IT FOR YOU TO PAY FOR THE VERY BASICS LIKE FOOD, HOUSING, MEDICAL CARE, AND HEATING?: NOT HARD AT ALL

## 2024-07-30 SDOH — ECONOMIC STABILITY: HOUSING INSECURITY
IN THE LAST 12 MONTHS, WAS THERE A TIME WHEN YOU DID NOT HAVE A STEADY PLACE TO SLEEP OR SLEPT IN A SHELTER (INCLUDING NOW)?: NO

## 2024-07-30 SDOH — ECONOMIC STABILITY: FOOD INSECURITY: WITHIN THE PAST 12 MONTHS, YOU WORRIED THAT YOUR FOOD WOULD RUN OUT BEFORE YOU GOT MONEY TO BUY MORE.: NEVER TRUE

## 2024-07-30 ASSESSMENT — PATIENT HEALTH QUESTIONNAIRE - PHQ9
2. FEELING DOWN, DEPRESSED OR HOPELESS: NOT AT ALL
SUM OF ALL RESPONSES TO PHQ QUESTIONS 1-9: 0
1. LITTLE INTEREST OR PLEASURE IN DOING THINGS: NOT AT ALL
SUM OF ALL RESPONSES TO PHQ9 QUESTIONS 1 & 2: 0

## 2024-07-30 NOTE — PROGRESS NOTES
Chief Complaint   Patient presents with    Headache    Medication Check       SUBJECTIVE:   Criselda Call is a 28 y.o. female presenting for an annual checkup.      HPI:   Hx of GERD- since we met in 2021. Saw GI in aug '23 at [facility] Dr. Samuel s/p EGD and c-scope in 9/13/22  \"Normal EGD. Distal esophageal biopsies benign. Continue PPI therapy. Continue working on lifestyle/diet modifications related to acid reflux.  Normal colonoscopy. Rectal bleeding due to small internal hemorrhoids. Recommend high-fiber diet and avoid constipation. \"  Still has heartburn but controlled w/ prn use of omeprazole.    Hypothyroidism- TSH wnl 12/6/23.    Stress urinary incontinence    Migraine without aura- getting them more often. Used to be once every 2 weeks, now getting 2x/week. Getting dizzy now. Requesting refill of excedrin. She takes one tablet once a day and does this 2x/week.headaches get better when pregnant. Saw neuro in [place] when she lived there. They couldn't do any more testing/treatment because she got pregnant. She has 3 kids.    Dizziness- lightheaded. For no reason. At work or at home. Stands at work and it can happen while standing. Needs to sit down. Happens every once in a while- had had plenty to eat and drink that day. Hasn't had iron medication. Unsure if that's the reason. Has used iron in the past. Periods are 'normal' not particularly heavy. No chest pain/palpitations/sob with lightheadedness. Not occurring w/ activity/sexual intercourse/ exertion    Does not eat meat. No milk/cheese/eggs. Sometimes eats yogurt.    Has gained 12lbs in 2mos and diet hasn't changed     Pap- cyto neg in 9/13/22    SH - . 3 kids, youngest born march '23    Patient Active Problem List   Diagnosis    S/P lumpectomy, left breast    Stress bladder incontinence, female    Acquired hypothyroidism    Gastroesophageal reflux disease without esophagitis    Migraine without aura and without status migrainosus, not

## 2024-07-31 DIAGNOSIS — D50.8 IRON DEFICIENCY ANEMIA SECONDARY TO INADEQUATE DIETARY IRON INTAKE: ICD-10-CM

## 2024-07-31 DIAGNOSIS — Z78.9 VEGETARIAN DIET: Primary | ICD-10-CM

## 2024-07-31 DIAGNOSIS — E53.8 B12 DEFICIENCY: ICD-10-CM

## 2024-07-31 LAB
ALBUMIN SERPL-MCNC: 4.5 G/DL (ref 3.4–5)
ALBUMIN/GLOB SERPL: 1.9 {RATIO} (ref 1.1–2.2)
ALP SERPL-CCNC: 45 U/L (ref 40–129)
ALT SERPL-CCNC: 17 U/L (ref 10–40)
ANION GAP SERPL CALCULATED.3IONS-SCNC: 11 MMOL/L (ref 3–16)
AST SERPL-CCNC: 16 U/L (ref 15–37)
BILIRUB SERPL-MCNC: 0.4 MG/DL (ref 0–1)
BUN SERPL-MCNC: 8 MG/DL (ref 7–20)
CALCIUM SERPL-MCNC: 9.5 MG/DL (ref 8.3–10.6)
CHLORIDE SERPL-SCNC: 103 MMOL/L (ref 99–110)
CO2 SERPL-SCNC: 26 MMOL/L (ref 21–32)
CREAT SERPL-MCNC: 0.6 MG/DL (ref 0.6–1.1)
GFR SERPLBLD CREATININE-BSD FMLA CKD-EPI: >90 ML/MIN/{1.73_M2}
GLUCOSE SERPL-MCNC: 81 MG/DL (ref 70–99)
IRON SATN MFR SERPL: 28 % (ref 15–50)
IRON SERPL-MCNC: 108 UG/DL (ref 37–145)
POTASSIUM SERPL-SCNC: 4.4 MMOL/L (ref 3.5–5.1)
PROT SERPL-MCNC: 6.9 G/DL (ref 6.4–8.2)
SODIUM SERPL-SCNC: 140 MMOL/L (ref 136–145)
TIBC SERPL-MCNC: 383 UG/DL (ref 260–445)
VIT B12 SERPL-MCNC: 288 PG/ML (ref 211–911)

## 2024-07-31 RX ORDER — FERROUS SULFATE 325(65) MG
325 TABLET ORAL
Qty: 30 TABLET | Refills: 2 | Status: SHIPPED | OUTPATIENT
Start: 2024-07-31

## 2025-06-10 ENCOUNTER — TELEPHONE (OUTPATIENT)
Dept: FAMILY MEDICINE CLINIC | Age: 30
End: 2025-06-10

## 2025-06-10 DIAGNOSIS — R42 LIGHTHEADEDNESS: ICD-10-CM

## 2025-06-10 DIAGNOSIS — E78.6 LOW HDL (UNDER 40): ICD-10-CM

## 2025-06-10 DIAGNOSIS — D50.9 IRON DEFICIENCY ANEMIA, UNSPECIFIED IRON DEFICIENCY ANEMIA TYPE: ICD-10-CM

## 2025-06-10 DIAGNOSIS — E03.9 ACQUIRED HYPOTHYROIDISM: Primary | ICD-10-CM

## 2025-06-10 DIAGNOSIS — Z00.00 PHYSICAL EXAM: ICD-10-CM

## 2025-06-10 NOTE — TELEPHONE ENCOUNTER
Labs have been ordered.    Please advised to get prior to upcoming appointment.     No further action is needed at this time; thank you!

## 2025-06-10 NOTE — TELEPHONE ENCOUNTER
Please order labs for pt to be done before their appointment. Please let me know when complete so I can inform the patient.

## 2025-06-10 NOTE — TELEPHONE ENCOUNTER
----- Message from Daniella PALMA sent at 6/10/2025  2:14 PM EDT -----  Regarding: [facility] Referral Request  [facility] Referral Request    Reason for referral request: Lab/Test Order    Specialist/Lab/Test patient is requesting (if known): blood work    Specialist Phone Number (if applicable): n/a      --------------------------------------------------------------------------------------------------------------------------    Relationship to Patient: Self     Call Back Information: OK to leave message on voicemail  Preferred Call Back Number: Phone [phone]

## 2025-06-16 RX ORDER — PHENAZOPYRIDINE HYDROCHLORIDE 200 MG/1
200 TABLET, FILM COATED ORAL
COMMUNITY
Start: 2025-04-26

## 2025-06-16 RX ORDER — CEPHALEXIN 500 MG/1
500 CAPSULE ORAL
COMMUNITY
Start: 2025-04-26

## 2025-06-17 ENCOUNTER — OFFICE VISIT (OUTPATIENT)
Dept: FAMILY MEDICINE CLINIC | Age: 30
End: 2025-06-17
Payer: MEDICAID

## 2025-06-17 VITALS
DIASTOLIC BLOOD PRESSURE: 60 MMHG | WEIGHT: 134.2 LBS | HEART RATE: 74 BPM | HEIGHT: 63 IN | SYSTOLIC BLOOD PRESSURE: 98 MMHG | TEMPERATURE: 97 F | BODY MASS INDEX: 23.78 KG/M2 | OXYGEN SATURATION: 98 %

## 2025-06-17 DIAGNOSIS — N64.4 BREAST PAIN, LEFT: Primary | ICD-10-CM

## 2025-06-17 DIAGNOSIS — Z98.890 S/P LUMPECTOMY, LEFT BREAST: ICD-10-CM

## 2025-06-17 PROCEDURE — 99213 OFFICE O/P EST LOW 20 MIN: CPT | Performed by: FAMILY MEDICINE

## 2025-06-17 SDOH — ECONOMIC STABILITY: FOOD INSECURITY: WITHIN THE PAST 12 MONTHS, YOU WORRIED THAT YOUR FOOD WOULD RUN OUT BEFORE YOU GOT MONEY TO BUY MORE.: NEVER TRUE

## 2025-06-17 SDOH — ECONOMIC STABILITY: FOOD INSECURITY: WITHIN THE PAST 12 MONTHS, THE FOOD YOU BOUGHT JUST DIDN'T LAST AND YOU DIDN'T HAVE MONEY TO GET MORE.: NEVER TRUE

## 2025-06-17 ASSESSMENT — PATIENT HEALTH QUESTIONNAIRE - PHQ9
SUM OF ALL RESPONSES TO PHQ QUESTIONS 1-9: 0
SUM OF ALL RESPONSES TO PHQ QUESTIONS 1-9: 0
2. FEELING DOWN, DEPRESSED OR HOPELESS: NOT AT ALL
SUM OF ALL RESPONSES TO PHQ QUESTIONS 1-9: 0
1. LITTLE INTEREST OR PLEASURE IN DOING THINGS: NOT AT ALL
SUM OF ALL RESPONSES TO PHQ QUESTIONS 1-9: 0

## 2025-06-17 NOTE — PROGRESS NOTES
Chief complaint: Breast Pain (Left; HAD 2 SURGERIES previously due to knots in breat(no cancer)Breast doc referral needed.Claims currently feeling knots; /Pt . Is requesting blood work)      SUBJECTIVE:  CLOVIS Call (:  1995) is a 29 y.o. female who presents with a chief complaint of: left breast pain. Worse on period time before and during. Started up again 3mos ago. This has happened before in  and  when living in [place], [place]. They took out 4lbs cyst one time. No famhx of breast cancer. Thought her mom had it but she never had breast cancer  LMP 25  Surgery on - severe incontinence. Surgery at [facility]. Gyn at [facility]. Wants tubes tied as well and wondering if she can do them  both at the same time.     Patient Active Problem List   Diagnosis    S/P lumpectomy, left breast    Stress bladder incontinence, female    Acquired hypothyroidism    Gastroesophageal reflux disease without esophagitis    Migraine without aura and without status migrainosus, not intractable    Intrinsic sphincter deficiency (ISD)    Internal hemorrhoids    Vegetarian diet    Lightheadedness     No past medical history on file.  Current Outpatient Medications on File Prior to Visit   Medication Sig Dispense Refill    ferrous sulfate (IRON 325) 325 (65 Fe) MG tablet Take 1 tablet by mouth daily (with breakfast) 30 tablet 2    cyanocobalamin ([employer] VITAMIN B12) 1000 MCG tablet Take 1 tablet by mouth daily 30 tablet 2    levothyroxine (SYNTHROID) 88 MCG tablet Take 1 tablet by mouth daily 90 tablet 3    omeprazole (PRILOSEC) 20 MG delayed release capsule Take 1 capsule by mouth Daily 30 capsule 11    aspirin-acetaminophen-caffeine (EXCEDRIN MIGRAINE) 250-250-65 MG per tablet Take 1 tablet by mouth every 6 hours as needed for Headaches 30 tablet 3    cephALEXin (KEFLEX) 500 MG capsule 1 capsule (Patient not taking: Reported on 2025)      phenazopyridine (PYRIDIUM) 200 MG tablet 1 tablet (Patient not taking:

## 2025-08-12 DIAGNOSIS — D50.8 IRON DEFICIENCY ANEMIA SECONDARY TO INADEQUATE DIETARY IRON INTAKE: ICD-10-CM

## 2025-08-12 DIAGNOSIS — E03.9 ACQUIRED HYPOTHYROIDISM: ICD-10-CM

## 2025-08-12 DIAGNOSIS — K21.9 GASTROESOPHAGEAL REFLUX DISEASE WITHOUT ESOPHAGITIS: ICD-10-CM

## 2025-08-13 RX ORDER — OMEPRAZOLE 20 MG/1
20 CAPSULE, DELAYED RELEASE ORAL DAILY
Qty: 30 CAPSULE | Refills: 0 | Status: SHIPPED | OUTPATIENT
Start: 2025-08-13

## 2025-08-13 RX ORDER — LEVOTHYROXINE SODIUM 88 UG/1
88 TABLET ORAL DAILY
Qty: 30 TABLET | Refills: 0 | Status: SHIPPED | OUTPATIENT
Start: 2025-08-13

## 2025-08-13 RX ORDER — FERROUS SULFATE 325(65) MG
1 TABLET ORAL DAILY
Qty: 30 TABLET | Refills: 0 | Status: SHIPPED | OUTPATIENT
Start: 2025-08-13 | End: 2025-08-14 | Stop reason: SDUPTHER

## 2025-08-14 ENCOUNTER — OFFICE VISIT (OUTPATIENT)
Dept: FAMILY MEDICINE CLINIC | Age: 30
End: 2025-08-14
Payer: MEDICAID

## 2025-08-14 ENCOUNTER — PATIENT MESSAGE (OUTPATIENT)
Dept: FAMILY MEDICINE CLINIC | Age: 30
End: 2025-08-14

## 2025-08-14 VITALS
HEART RATE: 79 BPM | WEIGHT: 131 LBS | OXYGEN SATURATION: 98 % | SYSTOLIC BLOOD PRESSURE: 109 MMHG | TEMPERATURE: 97 F | HEIGHT: 63 IN | DIASTOLIC BLOOD PRESSURE: 73 MMHG | BODY MASS INDEX: 23.21 KG/M2

## 2025-08-14 DIAGNOSIS — D50.8 IRON DEFICIENCY ANEMIA SECONDARY TO INADEQUATE DIETARY IRON INTAKE: ICD-10-CM

## 2025-08-14 DIAGNOSIS — E53.8 B12 DEFICIENCY: ICD-10-CM

## 2025-08-14 DIAGNOSIS — N39.3 STRESS BLADDER INCONTINENCE, FEMALE: ICD-10-CM

## 2025-08-14 DIAGNOSIS — E03.9 ACQUIRED HYPOTHYROIDISM: ICD-10-CM

## 2025-08-14 DIAGNOSIS — Z01.810 PREOPERATIVE CARDIOVASCULAR EXAMINATION: Primary | ICD-10-CM

## 2025-08-14 DIAGNOSIS — Z78.9 VEGETARIAN DIET: ICD-10-CM

## 2025-08-14 PROBLEM — R42 LIGHTHEADEDNESS: Status: RESOLVED | Noted: 2024-07-30 | Resolved: 2025-08-14

## 2025-08-14 PROCEDURE — 99214 OFFICE O/P EST MOD 30 MIN: CPT | Performed by: FAMILY MEDICINE

## 2025-08-14 RX ORDER — FERROUS SULFATE 325(65) MG
1 TABLET ORAL DAILY
Qty: 30 TABLET | Refills: 2 | Status: SHIPPED | OUTPATIENT
Start: 2025-08-14

## 2025-08-14 ASSESSMENT — PATIENT HEALTH QUESTIONNAIRE - PHQ9
1. LITTLE INTEREST OR PLEASURE IN DOING THINGS: NOT AT ALL
SUM OF ALL RESPONSES TO PHQ QUESTIONS 1-9: 0
SUM OF ALL RESPONSES TO PHQ QUESTIONS 1-9: 0
2. FEELING DOWN, DEPRESSED OR HOPELESS: NOT AT ALL
SUM OF ALL RESPONSES TO PHQ QUESTIONS 1-9: 0
SUM OF ALL RESPONSES TO PHQ QUESTIONS 1-9: 0